# Patient Record
Sex: MALE | Race: BLACK OR AFRICAN AMERICAN | Employment: UNEMPLOYED | URBAN - METROPOLITAN AREA
[De-identification: names, ages, dates, MRNs, and addresses within clinical notes are randomized per-mention and may not be internally consistent; named-entity substitution may affect disease eponyms.]

---

## 2019-06-24 ENCOUNTER — HOSPITAL ENCOUNTER (OUTPATIENT)
Dept: NON INVASIVE DIAGNOSTICS | Age: 49
Discharge: HOME | End: 2019-06-24
Attending: NURSE PRACTITIONER
Payer: MEDICAID

## 2019-06-24 VITALS
SYSTOLIC BLOOD PRESSURE: 132 MMHG | BODY MASS INDEX: 41.99 KG/M2 | DIASTOLIC BLOOD PRESSURE: 81 MMHG | WEIGHT: 310 LBS | HEIGHT: 72 IN

## 2019-06-24 DIAGNOSIS — R07.9 CHEST PAIN, UNSPECIFIED: ICD-10-CM

## 2019-06-24 LAB
STRESS ANGINA INDEX: 0
STRESS BASELINE HR: 94 BPM
STRESS ESTIMATED WORKLOAD: 6.7 METS
STRESS EXERCISE DUR MIN: NORMAL
STRESS PEAK DIAS BP: 85 MMHG
STRESS PEAK SYS BP: 185 MMHG
STRESS PERCENT HR ACHIEVED: 92 %
STRESS POST PEAK HR: 157 BPM
STRESS RATE PRESSURE PRODUCT: NORMAL BPM*MMHG
STRESS SR DUKE TREADMILL SCORE: 0
STRESS ST DEPRESSION: 0 MM
STRESS ST ELEVATION: 0 MM
STRESS TARGET HR: 171 BPM

## 2019-06-24 PROCEDURE — 93017 CV STRESS TEST TRACING ONLY: CPT

## 2019-06-24 RX ORDER — METFORMIN HYDROCHLORIDE 500 MG/1
TABLET ORAL 2 TIMES DAILY WITH MEALS
COMMUNITY

## 2019-06-24 RX ORDER — ASPIRIN 81 MG/1
TABLET ORAL DAILY
COMMUNITY

## 2019-06-24 RX ORDER — OMEPRAZOLE 20 MG/1
20 CAPSULE, DELAYED RELEASE ORAL DAILY
COMMUNITY

## 2019-11-20 ENCOUNTER — HOSPITAL ENCOUNTER (OUTPATIENT)
Dept: ULTRASOUND IMAGING | Age: 49
Discharge: HOME | End: 2019-11-20
Attending: NURSE PRACTITIONER
Payer: MEDICAID

## 2019-11-20 DIAGNOSIS — R10.9 ABDOMINAL PAIN: ICD-10-CM

## 2019-11-20 PROCEDURE — 76705 ECHO EXAM OF ABDOMEN: CPT

## 2020-02-25 ENCOUNTER — APPOINTMENT (OUTPATIENT)
Dept: PHYSICAL THERAPY | Age: 50
End: 2020-02-25

## 2020-03-06 ENCOUNTER — APPOINTMENT (OUTPATIENT)
Dept: PHYSICAL THERAPY | Age: 50
End: 2020-03-06

## 2022-04-05 ENCOUNTER — APPOINTMENT (OUTPATIENT)
Dept: PHYSICAL THERAPY | Age: 52
End: 2022-04-05

## 2022-04-12 ENCOUNTER — APPOINTMENT (OUTPATIENT)
Dept: PHYSICAL THERAPY | Age: 52
End: 2022-04-12

## 2022-07-01 ENCOUNTER — TRANSCRIBE ORDER (OUTPATIENT)
Dept: SCHEDULING | Age: 52
End: 2022-07-01

## 2022-07-21 ENCOUNTER — APPOINTMENT (OUTPATIENT)
Dept: PHYSICAL THERAPY | Age: 52
End: 2022-07-21
Payer: MEDICAID

## 2022-08-08 ENCOUNTER — HOSPITAL ENCOUNTER (OUTPATIENT)
Dept: PHYSICAL THERAPY | Age: 52
Discharge: HOME | End: 2022-08-08
Payer: MEDICAID

## 2022-08-08 PROCEDURE — 97162 PT EVAL MOD COMPLEX 30 MIN: CPT

## 2022-08-08 NOTE — PROGRESS NOTES
[address] [facility] AT [address] 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   ========================================================================  Assessment / key information:  Pt is a 44 yo R hand dominant male presenting with L shoulder pain s/sx consistent with rotator cuff involvement, impingement, and adhesive capsulitis. Pt reports insidious onset with no known SAURABH, denies traumas/falls/repetitive activity. Pain ranges from 0-8/10, worse with reaching behind back and OH, carrying, and sleeping, improved with rest. Pt demonstrates painful arc, (+) Neers and Empty Can tests, decr and painful AROM and PROM, severe muscle guarding, poor c/s and shoulder posture, and decr L shoulder strength. Functional deficits include difficulty reaching, carrying, lifting, and sleeping. Pt would benefit from skilled PT to address these deficits to assist pt to full return to PLOF activity. HEP initiated w/ good pt tolerance noted. There are no red flags at this time.  Further assessment as follows:    Posture: Slumped sitting posture, rounded protracted and elevated shoulders B (L>R), forward head, Dowagers hump  Observation: Poor kinematics with B shoulder elevation, severely incr muscle guarding of L UE with UT recruitment and c/s and trunk compensation    ROM:    C/s: decr AROM globally  Shoulder:                      AROM                    PROM   Left Right Left Right   Flexion 110 p!  p! NT   Extension 30 p! WNL  NT   Scaption/ p! WNL 70 p! NT   YANNI Occiput p! T3  NT   FIR Hip p! T12  NT   ER @ 30 Abd   15 p!  NT   IR @ 30 Abd   45 NT     Painful Arc: (+) pain >100 deg  End Feel: Firm    Strength:                                                                              L (1-5) R (1-5)   Flexors 5 p! 5   Abductors 5 p! 5   External Rotators 4 p! 5   Internal Rotators 5 5   Mid Trap 3- 5   Lower Trapezius unable 3+   Elbow Flexion 5 5   Elbow Extension 5 5   Scap Squeeze Test 18 sec      Palpation:  TTP at L deltoid, UT, Lev scap  Noted hypertonicity L UT    Neuro:  N/A    Impingement:  Neer's Test  [x] Pos   [] Neg   Hawkin's Test  [] Pos   [x] Neg  Empty Can  [x] Pos   [] Neg  Cross Body Add [] Pos   [x] Neg  Yergason's Test [] Pos   [x] Neg  Flexibility:  Pectoral Tightness [] Pos   [x] Neg  Lat Tightness  [x] Pos   [] Neg     Other Tests / Comments:    FOTO: 53/100    ========================================================================  Eval Complexity: History: MEDIUM  Complexity : 1-2 comorbidities / personal factors will impact the outcome/ POC Exam:HIGH Complexity : 4+ Standardized tests and measures addressing body structure, function, activity limitation and / or participation in recreation  Presentation: MEDIUM Complexity : Evolving with changing characteristics  Clinical Decision Making:MEDIUM Complexity : FOTO score of 26-74Overall Complexity:MEDIUM  Problem List: pain affecting function, decrease ROM, decrease strength, decrease ADL/ functional abilitiies, decrease activity tolerance, decrease flexibility/ joint mobility, and decrease transfer abilities      Treatment Plan may include any combination of the following: Therapeutic exercise, Therapeutic activities, Neuromuscular re-education, Physical agent/modality, Gait/balance training, Manual therapy, Aquatic therapy, Patient education, Self Care training, Functional mobility training, Home safety training, and Stair training  Patient / Family readiness to learn indicated by: asking questions, trying to perform skills, and interest  Persons(s) to be included in education: patient (P)  Barriers to Learning/Limitations: None  Measures taken:    Patient Goal (s): \"Decrease pain, sleep at night\"   Patient self reported health status: good  Rehabilitation Potential: good    Short Term Goals: To be accomplished in  1  week:   1. Pt will be compliant with initial HEP to improve ability to independently manage symptoms. Status at last assessment: reviewed and initiated HEP    Long Term Goals: To be accomplished in  4-6  weeks:  1. Pt to report FOTO score of at least 63/100 pts to demonstrate improved function and quality of life. Status at last assessment: 53/100  2. Pt will have an increase in L GHJ AROM elevation to > or = 140 Degrees with normal mechanics and no increase in pain to improve reaching and driving. Status at last assessment: L GHJ flexion: 110, abd: 105 with pain, UT recruitment, guarding  3. Pt will demonstrate L shoulder YANNI/FIR painfree and equal to that of R shoulder for ease with dressing and grooming. Status at last assessment: L YANNI: Occiput with pain, FIR: Hip with pain. R YANNI: T3, FIR: T12 painfree. 4. Pt will be able to self-manage ave pain to < or = 4/10 ave to restore ease of movement, self-care, sleep.    Status at last assessment: Pain rating 0-8/10, worse with reaching, carrying, sleep       Frequency / Duration:   Patient to be seen  2  times per week for 8-10  treatments:     Patient / Caregiver education and instruction: self care, activity modification, and exercises  Therapist Signature: Jerman Peterson PT Date: 1/0/3121   Certification Period: na Time: 9:03 AM ========================================================================  I certify that the above Physical Therapy Services are being furnished while the patient is under my care. I agree with the treatment plan and certify that this therapy is necessary. Physician Signature:        Date:       Time: ___                                            Morris Villanueva NP. Please sign and return to [facility] or you may fax the signed copy to [phone]. Thank you.

## 2022-08-08 NOTE — PROGRESS NOTES
PT DAILY TREATMENT NOTE/SHOULDER EVAL     Patient Name: Danielle Rodas  Date:2022  : 1970  [x]  Patient  Verified  Payor: Joana Raymundo / Plan: [facility] COMMUNITY PLAN [facility] CCCP / Product Type: Managed Care Medicaid /    In time:1355  Out time:1445  Total Treatment Time (min): 50  Visit #: 1 of 8-10    Medicare/BCBS Only   Total Timed Codes (min):  50 1:1 Treatment Time:  50       Treatment Area: Left shoulder pain [M25.512]    SUBJECTIVE    CC: L shoulder pain  Pain: Current: 0/10 Best: 0/10 Worst: 8/10   Alleviated By: resting, pain meds   Aggravated By: reaching behind back or OH, sleeping, carrying   PLOF: functionally independent  Mechanism of Injury: Reaching for something some time last December \"I don't know how it happened, I reached for something and felt it popping\". Denies falls, accidents. 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 Use of shoulder anatomical model to discuss impingement, anatomy, and postural re-education. Written HEP provided to patient and PT instructed pt on performance. Pt demo good understanding, denies having questions.      Discussed the following activity modifications:  - Avoid performing provocative movements and activities       OBJECTIVE    Posture: Slumped sitting posture, rounded protracted and elevated shoulders B (L>R), forward head, Dowagers hump  Observation: Poor kinematics with B shoulder elevation, severely incr muscle guarding of L UE with UT recruitment and c/s and trunk compensation    ROM:    C/s: decr AROM globally  Shoulder:                      AROM                    PROM   Left Right Left Right   Flexion 110 p!  p! NT   Extension 30 p! WNL  NT   Scaption/ p! WNL 70 p! NT   YANNI Occiput p! T3  NT   FIR Hip p! T12  NT   ER @ 30 Abd   15 p! NT   IR @ 30 Abd   45 NT     Painful Arc: (+) pain >100 deg  End Feel: Firm    Strength:                                                                              L (1-5) R (1-5)   Flexors 5 p! 5   Abductors 5 p! 5   External Rotators 4 p! 5   Internal Rotators 5 5   Mid Trap 3- 5   Lower Trapezius unable 3+   Elbow Flexion 5 5   Elbow Extension 5 5   Scap Squeeze Test 18 sec      Palpation:  TTP at L deltoid, UT, Lev scap  Noted hypertonicity L UT    Neuro:  N/A    Impingement:  Neer's Test  [x] Pos   [] Neg   Hawkin's Test  [] Pos   [x] Neg  Empty Can  [x] Pos   [] Neg  Cross Body Add [] Pos   [x] Neg  Yergason's Test [] Pos   [x] Neg  Flexibility:  Pectoral Tightness [] Pos   [x] Neg  Lat Tightness  [x] Pos   [] Neg     Other Tests / Comments:    FOTO: 53/100      Pain Level (0-10 scale) post treatment: 0/10    ASSESSMENT/Changes in Function: Please see POC    Patient will continue to benefit from skilled PT services to modify and progress therapeutic interventions, address functional mobility deficits, address ROM deficits, address strength deficits, analyze and address soft tissue restrictions, analyze and cue movement patterns, analyze and modify body mechanics/ergonomics, assess and modify postural abnormalities, address imbalance/dizziness, and instruct in home and community integration to attain remaining goals.      [x]  See Plan of Care  []  See progress note/recertification  []  See Discharge Summary         Progress towards goals / Updated goals:  Please see POC    PLAN  [x]  Upgrade activities as tolerated     [x]  Continue plan of care  []  Update interventions per flow sheet       []  Discharge due to:_  [x]  Other:_  POC 2x/week for 4 weeks    Jason Jacobsen, PT 8/8/2022  9:03 AM

## 2022-08-22 ENCOUNTER — TRANSCRIBE ORDER (OUTPATIENT)
Dept: SCHEDULING | Age: 52
End: 2022-08-22

## 2022-08-22 DIAGNOSIS — M25.512 LEFT SHOULDER PAIN: Primary | ICD-10-CM

## 2022-09-07 ENCOUNTER — APPOINTMENT (OUTPATIENT)
Dept: PHYSICAL THERAPY | Age: 52
End: 2022-09-07

## 2022-09-20 ENCOUNTER — APPOINTMENT (OUTPATIENT)
Dept: PHYSICAL THERAPY | Age: 52
End: 2022-09-20

## 2022-09-26 NOTE — PROGRESS NOTES
[facility] PHYSICAL THERAPY AT [address] [place] [address] [place] [place] [address]  Phone: [phone] Fax 21 [phone] SUMMARY  Patient Name: Wilber Cooks : 1970   Treatment/Medical Diagnosis: Left shoulder pain [M25.512]   Referral Source: Malcolm Cerda NP     Date of Initial Visit: 22 Attended Visits: 1 Missed Visits: 3     SUMMARY OF TREATMENT  Pt is a 44 yo R hand dominant male presenting with L shoulder pain s/sx consistent with rotator cuff involvement, impingement, and adhesive capsulitis. Treatment consisted of initial evaluation and review of HEP. CURRENT STATUS  Pt attended 1 skilled Physical Therapy appointments including Initial Evaluation. Pt failed to attend remaining scheduled appointments and therefore is being discharged from our services. Unable to complete a formal re-assessment due to lack of compliance to appointments. Please send new consult if further therapy should be indicated. Progress towards goals: Did not meet therapeutic goals d/t lack of compliance with attendance. RECOMMENDATIONS  Discontinue therapy due to lack of attendance or compliance. If you have any questions/comments please contact us directly at [phone]. Thank you for allowing us to assist in the care of your patient. To ensure we are able to process the patients encounter and avoid risk of your patient receiving a bill for our services, please sign and return this discharge summary by 22. (30days following DC date)    Therapist Signature: Miranda Maria PT Date: 22   Reporting Period: 22 - 22 Time: 1:46 PM   NOTE TO PHYSICIAN:  Your patient's insurance requires this discharge note be signed and returned. PLEASE COMPLETE THE ORDERS BELOW AND RETURN TO:  [facility]    ___ I have read the above report and request that my patient be discharged from therapy.      Physician Signature:        Date:       Time:                                        Star Maharaj NP